# Patient Record
Sex: FEMALE | Race: OTHER | NOT HISPANIC OR LATINO
[De-identification: names, ages, dates, MRNs, and addresses within clinical notes are randomized per-mention and may not be internally consistent; named-entity substitution may affect disease eponyms.]

---

## 2023-03-06 ENCOUNTER — APPOINTMENT (OUTPATIENT)
Dept: ORTHOPEDIC SURGERY | Facility: CLINIC | Age: 53
End: 2023-03-06
Payer: MEDICAID

## 2023-03-06 DIAGNOSIS — M94.20 CHONDROMALACIA, UNSPECIFIED SITE: ICD-10-CM

## 2023-03-06 PROBLEM — Z00.00 ENCOUNTER FOR PREVENTIVE HEALTH EXAMINATION: Status: ACTIVE | Noted: 2023-03-06

## 2023-03-06 PROCEDURE — 99214 OFFICE O/P EST MOD 30 MIN: CPT

## 2023-03-06 PROCEDURE — 73562 X-RAY EXAM OF KNEE 3: CPT | Mod: LT

## 2023-03-06 RX ORDER — NABUMETONE 750 MG/1
750 TABLET, FILM COATED ORAL DAILY
Qty: 60 | Refills: 1 | Status: ACTIVE | COMMUNITY
Start: 2023-03-06 | End: 1900-01-01

## 2023-03-06 NOTE — HISTORY OF PRESENT ILLNESS
[de-identified] : 52 year old female presents with LT knee pain. She is a homemaker. She points anterior and posterior as the location of pain. Describes the pain as a 7 at rest and a 9 with activity on the pain scale. Pain with climbing stairs and standing for long periods of time. She admits the knee does buckle/give-way when she is walking. Her PCP prescribed her meloxicam for the knee pain which she has been taking for a month and has had no relief.\par \par H/o hypertension.\par \par X-ray standing left knee AP and lateral done in office today, reviewed and analyzed. Shows Kellgren-Adrien grade one along the medial compartment. No fracture, or soft tissue calcifications.\par \par Left knee has a positive Missael's medially small knee effusion guarded range of motion negative Homans' sign no erythema ligaments are stable\par \par Dx: LT knee Medial meniscal tear, chondromalacia.\par \par Recommending a LT knee MRI to rule out a meniscal tear. She was provided the [facility] knee conditioning program to complete at home. She will receive a referral for formal PT, which she will use if she has time. We will change her anti-inflammatory, advised her to keep track of her BP. Will follow-up in 4-6 weeks.\par \par Instructions: NSAIDS\par Patient is to begin oral anti-inflammatory medications on an as needed basis, as long as there are no contraindications. Patient is counseled on possible GI and BP side effects.

## 2023-04-10 ENCOUNTER — APPOINTMENT (OUTPATIENT)
Dept: ORTHOPEDIC SURGERY | Facility: CLINIC | Age: 53
End: 2023-04-10

## 2023-05-18 ENCOUNTER — APPOINTMENT (OUTPATIENT)
Dept: ORTHOPEDIC SURGERY | Facility: CLINIC | Age: 53
End: 2023-05-18
Payer: MEDICAID

## 2023-05-18 DIAGNOSIS — S83.232A COMPLEX TEAR OF MEDIAL MENISCUS, CURRENT INJURY, LEFT KNEE, INITIAL ENCOUNTER: ICD-10-CM

## 2023-05-18 PROCEDURE — 99214 OFFICE O/P EST MOD 30 MIN: CPT

## 2023-05-18 NOTE — HISTORY OF PRESENT ILLNESS
[de-identified] : Patient is here for evaluation of her knee she has been doing home exercises with minimal relief she has been off and on with the medicine feels it does not help so she stopped it\par \par On exam she had a positive Missael's medially but good range of motion ligaments are stable negative Homans' sign\par \par We talked about all options including surgery continue nonoperative foot with a home therapy and she would like to do that I will see her back in about 6 weeks I did discuss all options if she still symptomatic for the left knee medial meniscal tear confirmed on MRI

## 2023-07-10 ENCOUNTER — APPOINTMENT (OUTPATIENT)
Dept: ORTHOPEDIC SURGERY | Facility: CLINIC | Age: 53
End: 2023-07-10